# Patient Record
Sex: FEMALE | Race: WHITE | NOT HISPANIC OR LATINO | Employment: PART TIME | URBAN - METROPOLITAN AREA
[De-identification: names, ages, dates, MRNs, and addresses within clinical notes are randomized per-mention and may not be internally consistent; named-entity substitution may affect disease eponyms.]

---

## 2021-01-22 ENCOUNTER — PATIENT MESSAGE (OUTPATIENT)
Dept: ADMINISTRATIVE | Facility: OTHER | Age: 36
End: 2021-01-22

## 2021-02-04 ENCOUNTER — IMMUNIZATION (OUTPATIENT)
Dept: PHARMACY | Facility: CLINIC | Age: 36
End: 2021-02-04
Payer: COMMERCIAL

## 2021-02-04 DIAGNOSIS — Z23 NEED FOR VACCINATION: Primary | ICD-10-CM

## 2021-03-04 ENCOUNTER — IMMUNIZATION (OUTPATIENT)
Dept: PHARMACY | Facility: CLINIC | Age: 36
End: 2021-03-04
Payer: COMMERCIAL

## 2021-03-04 DIAGNOSIS — Z23 NEED FOR VACCINATION: Primary | ICD-10-CM

## 2021-07-28 ENCOUNTER — OFFICE VISIT (OUTPATIENT)
Dept: OBSTETRICS AND GYNECOLOGY | Facility: CLINIC | Age: 36
End: 2021-07-28
Payer: COMMERCIAL

## 2021-07-28 VITALS
RESPIRATION RATE: 19 BRPM | WEIGHT: 152 LBS | BODY MASS INDEX: 26.93 KG/M2 | HEIGHT: 63 IN | DIASTOLIC BLOOD PRESSURE: 78 MMHG | SYSTOLIC BLOOD PRESSURE: 104 MMHG | HEART RATE: 68 BPM

## 2021-07-28 DIAGNOSIS — Z12.4 CERVICAL CANCER SCREENING: ICD-10-CM

## 2021-07-28 DIAGNOSIS — Z01.419 ENCNTR FOR GYN EXAM (GENERAL) (ROUTINE) W/O ABN FINDINGS: Primary | ICD-10-CM

## 2021-07-28 PROCEDURE — 1160F PR REVIEW ALL MEDS BY PRESCRIBER/CLIN PHARMACIST DOCUMENTED: ICD-10-PCS | Mod: CPTII,S$GLB,, | Performed by: STUDENT IN AN ORGANIZED HEALTH CARE EDUCATION/TRAINING PROGRAM

## 2021-07-28 PROCEDURE — 99385 PREV VISIT NEW AGE 18-39: CPT | Mod: S$GLB,,, | Performed by: STUDENT IN AN ORGANIZED HEALTH CARE EDUCATION/TRAINING PROGRAM

## 2021-07-28 PROCEDURE — 87624 HPV HI-RISK TYP POOLED RSLT: CPT | Performed by: STUDENT IN AN ORGANIZED HEALTH CARE EDUCATION/TRAINING PROGRAM

## 2021-07-28 PROCEDURE — 1159F PR MEDICATION LIST DOCUMENTED IN MEDICAL RECORD: ICD-10-PCS | Mod: CPTII,S$GLB,, | Performed by: STUDENT IN AN ORGANIZED HEALTH CARE EDUCATION/TRAINING PROGRAM

## 2021-07-28 PROCEDURE — 99999 PR PBB SHADOW E&M-EST. PATIENT-LVL III: CPT | Mod: PBBFAC,,, | Performed by: STUDENT IN AN ORGANIZED HEALTH CARE EDUCATION/TRAINING PROGRAM

## 2021-07-28 PROCEDURE — 88175 CYTOPATH C/V AUTO FLUID REDO: CPT | Performed by: STUDENT IN AN ORGANIZED HEALTH CARE EDUCATION/TRAINING PROGRAM

## 2021-07-28 PROCEDURE — 99999 PR PBB SHADOW E&M-EST. PATIENT-LVL III: ICD-10-PCS | Mod: PBBFAC,,, | Performed by: STUDENT IN AN ORGANIZED HEALTH CARE EDUCATION/TRAINING PROGRAM

## 2021-07-28 PROCEDURE — 1160F RVW MEDS BY RX/DR IN RCRD: CPT | Mod: CPTII,S$GLB,, | Performed by: STUDENT IN AN ORGANIZED HEALTH CARE EDUCATION/TRAINING PROGRAM

## 2021-07-28 PROCEDURE — 1159F MED LIST DOCD IN RCRD: CPT | Mod: CPTII,S$GLB,, | Performed by: STUDENT IN AN ORGANIZED HEALTH CARE EDUCATION/TRAINING PROGRAM

## 2021-07-28 PROCEDURE — 3008F PR BODY MASS INDEX (BMI) DOCUMENTED: ICD-10-PCS | Mod: CPTII,S$GLB,, | Performed by: STUDENT IN AN ORGANIZED HEALTH CARE EDUCATION/TRAINING PROGRAM

## 2021-07-28 PROCEDURE — 1126F PR PAIN SEVERITY QUANTIFIED, NO PAIN PRESENT: ICD-10-PCS | Mod: CPTII,S$GLB,, | Performed by: STUDENT IN AN ORGANIZED HEALTH CARE EDUCATION/TRAINING PROGRAM

## 2021-07-28 PROCEDURE — 99385 PR PREVENTIVE VISIT,NEW,18-39: ICD-10-PCS | Mod: S$GLB,,, | Performed by: STUDENT IN AN ORGANIZED HEALTH CARE EDUCATION/TRAINING PROGRAM

## 2021-07-28 PROCEDURE — 3008F BODY MASS INDEX DOCD: CPT | Mod: CPTII,S$GLB,, | Performed by: STUDENT IN AN ORGANIZED HEALTH CARE EDUCATION/TRAINING PROGRAM

## 2021-07-28 PROCEDURE — 1126F AMNT PAIN NOTED NONE PRSNT: CPT | Mod: CPTII,S$GLB,, | Performed by: STUDENT IN AN ORGANIZED HEALTH CARE EDUCATION/TRAINING PROGRAM

## 2021-07-28 RX ORDER — CLONAZEPAM 0.5 MG/1
0.25 TABLET ORAL
COMMUNITY

## 2021-07-28 RX ORDER — DULOXETIN HYDROCHLORIDE 20 MG/1
CAPSULE, DELAYED RELEASE ORAL
COMMUNITY

## 2021-08-02 LAB
HPV HR 12 DNA SPEC QL NAA+PROBE: NEGATIVE
HPV16 AG SPEC QL: NEGATIVE
HPV18 DNA SPEC QL NAA+PROBE: NEGATIVE

## 2021-08-06 LAB
FINAL PATHOLOGIC DIAGNOSIS: NORMAL
Lab: NORMAL

## 2022-04-18 ENCOUNTER — PATIENT MESSAGE (OUTPATIENT)
Dept: OBSTETRICS AND GYNECOLOGY | Facility: CLINIC | Age: 37
End: 2022-04-18
Payer: COMMERCIAL

## 2022-05-30 ENCOUNTER — OFFICE VISIT (OUTPATIENT)
Dept: OBSTETRICS AND GYNECOLOGY | Facility: CLINIC | Age: 37
End: 2022-05-30
Payer: COMMERCIAL

## 2022-05-30 ENCOUNTER — TELEPHONE (OUTPATIENT)
Dept: OBSTETRICS AND GYNECOLOGY | Facility: CLINIC | Age: 37
End: 2022-05-30
Payer: COMMERCIAL

## 2022-05-30 VITALS
HEIGHT: 63 IN | RESPIRATION RATE: 12 BRPM | SYSTOLIC BLOOD PRESSURE: 116 MMHG | BODY MASS INDEX: 29.55 KG/M2 | OXYGEN SATURATION: 100 % | WEIGHT: 166.81 LBS | HEART RATE: 82 BPM | DIASTOLIC BLOOD PRESSURE: 72 MMHG

## 2022-05-30 DIAGNOSIS — N92.1 MENORRHAGIA WITH IRREGULAR CYCLE: Primary | ICD-10-CM

## 2022-05-30 PROCEDURE — 1159F MED LIST DOCD IN RCRD: CPT | Mod: CPTII,S$GLB,, | Performed by: STUDENT IN AN ORGANIZED HEALTH CARE EDUCATION/TRAINING PROGRAM

## 2022-05-30 PROCEDURE — 99213 PR OFFICE/OUTPT VISIT, EST, LEVL III, 20-29 MIN: ICD-10-PCS | Mod: S$GLB,,, | Performed by: STUDENT IN AN ORGANIZED HEALTH CARE EDUCATION/TRAINING PROGRAM

## 2022-05-30 PROCEDURE — 99213 OFFICE O/P EST LOW 20 MIN: CPT | Mod: S$GLB,,, | Performed by: STUDENT IN AN ORGANIZED HEALTH CARE EDUCATION/TRAINING PROGRAM

## 2022-05-30 PROCEDURE — 3008F BODY MASS INDEX DOCD: CPT | Mod: CPTII,S$GLB,, | Performed by: STUDENT IN AN ORGANIZED HEALTH CARE EDUCATION/TRAINING PROGRAM

## 2022-05-30 PROCEDURE — 1160F PR REVIEW ALL MEDS BY PRESCRIBER/CLIN PHARMACIST DOCUMENTED: ICD-10-PCS | Mod: CPTII,S$GLB,, | Performed by: STUDENT IN AN ORGANIZED HEALTH CARE EDUCATION/TRAINING PROGRAM

## 2022-05-30 PROCEDURE — 3074F PR MOST RECENT SYSTOLIC BLOOD PRESSURE < 130 MM HG: ICD-10-PCS | Mod: CPTII,S$GLB,, | Performed by: STUDENT IN AN ORGANIZED HEALTH CARE EDUCATION/TRAINING PROGRAM

## 2022-05-30 PROCEDURE — 99999 PR PBB SHADOW E&M-EST. PATIENT-LVL IV: CPT | Mod: PBBFAC,,, | Performed by: STUDENT IN AN ORGANIZED HEALTH CARE EDUCATION/TRAINING PROGRAM

## 2022-05-30 PROCEDURE — 3078F PR MOST RECENT DIASTOLIC BLOOD PRESSURE < 80 MM HG: ICD-10-PCS | Mod: CPTII,S$GLB,, | Performed by: STUDENT IN AN ORGANIZED HEALTH CARE EDUCATION/TRAINING PROGRAM

## 2022-05-30 PROCEDURE — 3008F PR BODY MASS INDEX (BMI) DOCUMENTED: ICD-10-PCS | Mod: CPTII,S$GLB,, | Performed by: STUDENT IN AN ORGANIZED HEALTH CARE EDUCATION/TRAINING PROGRAM

## 2022-05-30 PROCEDURE — 99999 PR PBB SHADOW E&M-EST. PATIENT-LVL IV: ICD-10-PCS | Mod: PBBFAC,,, | Performed by: STUDENT IN AN ORGANIZED HEALTH CARE EDUCATION/TRAINING PROGRAM

## 2022-05-30 PROCEDURE — 1159F PR MEDICATION LIST DOCUMENTED IN MEDICAL RECORD: ICD-10-PCS | Mod: CPTII,S$GLB,, | Performed by: STUDENT IN AN ORGANIZED HEALTH CARE EDUCATION/TRAINING PROGRAM

## 2022-05-30 PROCEDURE — 3078F DIAST BP <80 MM HG: CPT | Mod: CPTII,S$GLB,, | Performed by: STUDENT IN AN ORGANIZED HEALTH CARE EDUCATION/TRAINING PROGRAM

## 2022-05-30 PROCEDURE — 1160F RVW MEDS BY RX/DR IN RCRD: CPT | Mod: CPTII,S$GLB,, | Performed by: STUDENT IN AN ORGANIZED HEALTH CARE EDUCATION/TRAINING PROGRAM

## 2022-05-30 PROCEDURE — 3074F SYST BP LT 130 MM HG: CPT | Mod: CPTII,S$GLB,, | Performed by: STUDENT IN AN ORGANIZED HEALTH CARE EDUCATION/TRAINING PROGRAM

## 2022-05-30 NOTE — PROGRESS NOTES
Subjective:       Patient ID: Lou Mcadams is a 37 y.o. female.    Chief Complaint:  Consult      History of Present Illness  Patient is a 38 yo  presenting to discuss management of irregular menses. She is reporting irregular menses with significant spotting throughout the month. Cycles has worsened significantly over the last year. She has used birth control in the past and had difficulties with the side effects. Her  is status post vasectomy. She would like an ablation.         GYN & OB History  No LMP recorded.   Date of Last Pap: 2021    OB History    Para Term  AB Living   4 3 3   1 3   SAB IAB Ectopic Multiple Live Births   1       3      # Outcome Date GA Lbr Dave/2nd Weight Sex Delivery Anes PTL Lv   4 SAB            3 Term            2 Term            1 Term                Review of Systems  Review of Systems   Constitutional: Positive for fatigue. Negative for chills, fever and unexpected weight change.   HENT: Negative for congestion, sinus pain and sore throat.    Eyes: Negative for visual disturbance.   Respiratory: Negative for cough, chest tightness and shortness of breath.    Cardiovascular: Positive for palpitations. Negative for chest pain.   Gastrointestinal: Negative for abdominal pain, constipation, diarrhea, nausea and vomiting.   Genitourinary: Positive for menstrual problem. Negative for vaginal bleeding, vaginal discharge and vaginal pain.   Musculoskeletal: Negative for myalgias.   Skin: Negative for color change, pallor and rash.   Neurological: Positive for dizziness and light-headedness. Negative for headaches.   Psychiatric/Behavioral: The patient is nervous/anxious.            Objective:    Physical Exam:   Constitutional: She is oriented to person, place, and time. She appears well-developed and well-nourished. No distress.    HENT:   Head: Normocephalic and atraumatic.    Eyes: Pupils are equal, round, and reactive to light. EOM are normal.      Cardiovascular: Normal rate.     Pulmonary/Chest: Effort normal.                  Musculoskeletal: Normal range of motion and moves all extremeties.       Neurological: She is alert and oriented to person, place, and time.    Skin: Skin is warm and dry. No erythema.    Psychiatric: She has a normal mood and affect. Her behavior is normal. Judgment and thought content normal.          Assessment:        1. Menorrhagia with irregular cycle             Plan:      Lou was seen today for consult.    Diagnoses and all orders for this visit:    Menorrhagia with irregular cycle  -     Case Request Operating Room: HYSTEROSCOPY, WITH DILATION AND CURETTAGE OF UTERUS, ABLATION, ENDOMETRIUM, THERMAL, HYSTEROSCOPIC  -     US Pelvis Comp with Transvag NON-OB (xpd; Future

## 2022-06-03 ENCOUNTER — APPOINTMENT (OUTPATIENT)
Dept: RADIOLOGY | Facility: CLINIC | Age: 37
End: 2022-06-03
Attending: STUDENT IN AN ORGANIZED HEALTH CARE EDUCATION/TRAINING PROGRAM
Payer: COMMERCIAL

## 2022-06-03 DIAGNOSIS — N92.1 MENORRHAGIA WITH IRREGULAR CYCLE: ICD-10-CM

## 2022-06-03 PROCEDURE — 76856 US PELVIS COMP WITH TRANSVAG NON-OB (XPD): ICD-10-PCS | Mod: 26,,, | Performed by: RADIOLOGY

## 2022-06-03 PROCEDURE — 76856 US EXAM PELVIC COMPLETE: CPT | Mod: 26,,, | Performed by: RADIOLOGY

## 2022-06-03 PROCEDURE — 76830 US PELVIS COMP WITH TRANSVAG NON-OB (XPD): ICD-10-PCS | Mod: 26,,, | Performed by: RADIOLOGY

## 2022-06-03 PROCEDURE — 76830 TRANSVAGINAL US NON-OB: CPT | Mod: TC

## 2022-06-03 PROCEDURE — 76830 TRANSVAGINAL US NON-OB: CPT | Mod: 26,,, | Performed by: RADIOLOGY

## 2022-07-25 ENCOUNTER — PATIENT MESSAGE (OUTPATIENT)
Dept: SURGERY | Facility: HOSPITAL | Age: 37
End: 2022-07-25
Payer: COMMERCIAL

## 2022-07-26 NOTE — TELEPHONE ENCOUNTER
Surgery, pre op, pre admit and post op appointments cancelled per patient's request.  Removed from surgery board.

## 2022-09-19 ENCOUNTER — OFFICE VISIT (OUTPATIENT)
Dept: OBSTETRICS AND GYNECOLOGY | Facility: CLINIC | Age: 37
End: 2022-09-19
Payer: COMMERCIAL

## 2022-09-19 VITALS
BODY MASS INDEX: 30.77 KG/M2 | SYSTOLIC BLOOD PRESSURE: 112 MMHG | HEART RATE: 60 BPM | DIASTOLIC BLOOD PRESSURE: 72 MMHG | HEIGHT: 63 IN | WEIGHT: 173.63 LBS

## 2022-09-19 DIAGNOSIS — Z01.419 ENCNTR FOR GYN EXAM (GENERAL) (ROUTINE) W/O ABN FINDINGS: Primary | ICD-10-CM

## 2022-09-19 PROCEDURE — 3078F DIAST BP <80 MM HG: CPT | Mod: CPTII,S$GLB,, | Performed by: STUDENT IN AN ORGANIZED HEALTH CARE EDUCATION/TRAINING PROGRAM

## 2022-09-19 PROCEDURE — 1159F MED LIST DOCD IN RCRD: CPT | Mod: CPTII,S$GLB,, | Performed by: STUDENT IN AN ORGANIZED HEALTH CARE EDUCATION/TRAINING PROGRAM

## 2022-09-19 PROCEDURE — 1160F PR REVIEW ALL MEDS BY PRESCRIBER/CLIN PHARMACIST DOCUMENTED: ICD-10-PCS | Mod: CPTII,S$GLB,, | Performed by: STUDENT IN AN ORGANIZED HEALTH CARE EDUCATION/TRAINING PROGRAM

## 2022-09-19 PROCEDURE — 99999 PR PBB SHADOW E&M-EST. PATIENT-LVL III: CPT | Mod: PBBFAC,,, | Performed by: STUDENT IN AN ORGANIZED HEALTH CARE EDUCATION/TRAINING PROGRAM

## 2022-09-19 PROCEDURE — 3074F SYST BP LT 130 MM HG: CPT | Mod: CPTII,S$GLB,, | Performed by: STUDENT IN AN ORGANIZED HEALTH CARE EDUCATION/TRAINING PROGRAM

## 2022-09-19 PROCEDURE — 1160F RVW MEDS BY RX/DR IN RCRD: CPT | Mod: CPTII,S$GLB,, | Performed by: STUDENT IN AN ORGANIZED HEALTH CARE EDUCATION/TRAINING PROGRAM

## 2022-09-19 PROCEDURE — 99395 PREV VISIT EST AGE 18-39: CPT | Mod: S$GLB,,, | Performed by: STUDENT IN AN ORGANIZED HEALTH CARE EDUCATION/TRAINING PROGRAM

## 2022-09-19 PROCEDURE — 1159F PR MEDICATION LIST DOCUMENTED IN MEDICAL RECORD: ICD-10-PCS | Mod: CPTII,S$GLB,, | Performed by: STUDENT IN AN ORGANIZED HEALTH CARE EDUCATION/TRAINING PROGRAM

## 2022-09-19 PROCEDURE — 99999 PR PBB SHADOW E&M-EST. PATIENT-LVL III: ICD-10-PCS | Mod: PBBFAC,,, | Performed by: STUDENT IN AN ORGANIZED HEALTH CARE EDUCATION/TRAINING PROGRAM

## 2022-09-19 PROCEDURE — 3008F PR BODY MASS INDEX (BMI) DOCUMENTED: ICD-10-PCS | Mod: CPTII,S$GLB,, | Performed by: STUDENT IN AN ORGANIZED HEALTH CARE EDUCATION/TRAINING PROGRAM

## 2022-09-19 PROCEDURE — 3078F PR MOST RECENT DIASTOLIC BLOOD PRESSURE < 80 MM HG: ICD-10-PCS | Mod: CPTII,S$GLB,, | Performed by: STUDENT IN AN ORGANIZED HEALTH CARE EDUCATION/TRAINING PROGRAM

## 2022-09-19 PROCEDURE — 3008F BODY MASS INDEX DOCD: CPT | Mod: CPTII,S$GLB,, | Performed by: STUDENT IN AN ORGANIZED HEALTH CARE EDUCATION/TRAINING PROGRAM

## 2022-09-19 PROCEDURE — 3074F PR MOST RECENT SYSTOLIC BLOOD PRESSURE < 130 MM HG: ICD-10-PCS | Mod: CPTII,S$GLB,, | Performed by: STUDENT IN AN ORGANIZED HEALTH CARE EDUCATION/TRAINING PROGRAM

## 2022-09-19 PROCEDURE — 99395 PR PREVENTIVE VISIT,EST,18-39: ICD-10-PCS | Mod: S$GLB,,, | Performed by: STUDENT IN AN ORGANIZED HEALTH CARE EDUCATION/TRAINING PROGRAM

## 2022-09-19 NOTE — PROGRESS NOTES
Subjective:    Patient ID: Lou Mcadams is a 37 y.o. y.o. female.     Chief Complaint: Annual Well Woman Exam     History of Present Illness:  Lou presents today for Annual Well Woman exam. She describes her menses as  very heavy and monthly .She denies pelvic pain.  She denies breast tenderness, masses, nipple discharge. She admits to difficulty with constipation.  She is sexually active. Contraception is by vasectomy.      Menstrual History:   Patient's last menstrual period was 2022..     OB History          4    Para   3    Term   3            AB   1    Living   3         SAB   1    IAB        Ectopic        Multiple        Live Births   3                 The following portions of the patient's history were reviewed and updated as appropriate: allergies, current medications, past family history, past medical history, past social history, past surgical history, and problem list.    ROS:   CONSTITUTIONAL: Negative for fever, chills, diaphoresis, weakness, fatigue, weight loss, weight gain  ENT: negative for sore throat, nasal congestion, nasal discharge, epistaxis, tinnitus, hearing loss  EYES: negative for blurry vision, decreased vision, loss of vision, eye pain, diplopia, photophobia, discharge  SKIN: Negative for rash, itching, hives  RESPIRATORY: negative for cough, hemoptysis, shortness of breath, pleuritic chest pain, wheezing  CARDIOVASCULAR: negative for chest pain, dyspnea on exertion, orthopnea, paroxysmal nocturnal dyspnea, edema, palpitations  BREAST: negative for breast  tenderness, breast mass, nipple discharge, or skin changes  GASTROINTESTINAL: + constipation  GENITOURINARY: vaginal bleeding, irregular menses, heavy menses, negative for abnormal vaginal bleeding, amenorrhea, decreased libido, dysuria, genital sores, hematuria, incontinence, menorrhagia, pelvic pain, urinary frequency, vaginal discharge  HEMATOLOGIC/LYMPHATIC: negative for swollen lymph nodes, bleeding,  bruising  MUSCULOSKELETAL: negative for back pain, joint pain, joint stiffness, joint swelling, muscle pain, muscle weakness  NEUROLOGICAL: negative for dizzy/vertigo, headache, focal weakness, numbness/tingling, speech problems, loss of consciousness, confusion, memory loss  BEHAVORIAL/PSYCH: negative for anxiety, depression, psychosis  ENDOCRINE: negative for polydipsia/polyuria, palpitations, skin changes, temperature intolerance, unexpected weight changes  ALLERGIC/IMMUNOLOGIC: negative for urticaria, hay fever, angioedema      Objective:    Vital Signs:  Vitals:    09/19/22 1405   BP: 112/72   Pulse: 60       Physical Exam:  General:  alert, cooperative, no distress   Skin:  Skin color, texture, turgor normal. No rashes or lesions   HEENT:  conjunctivae/corneas clear. PERRL.   Neck: supple, trachea midline, no adenopathy or thyromegally   Respiratory:  Normal effort   Breasts:  no discharge, erythema, tenderness, or palpable masses; no axillary lymphadenopathy   Abdomen:  soft, nontender, no palpable masses   Pelvis: External genitalia: normal general appearance  Urinary system: urethral meatus normal, bladder nontender  Vaginal: normal mucosa without prolapse or lesions  Cervix: normal appearance  Uterus: normal size, shape, position  Adnexa: normal size, nontender bilaterally   Extremities: Normal ROM; no edema, no cyanosis   Neurologial: Normal strength and tone. No focal numbness or weakness.   Psychiatric: normal mood, speech, dress, and thought processes             Assessment:       Healthy female exam.     1. Encntr for gyn exam (general) (routine) w/o abn findings          Plan:      Problem List Items Addressed This Visit    None  Visit Diagnoses       Encntr for gyn exam (general) (routine) w/o abn findings    -  Primary            COUNSELING:  Lou was counseled on STD prevention, use and side-effects of various contraceptive measures, A.C.O.G. Pap guidelines and recommendations for yearly pelvic  exams in addition to recommendations for monthly self breast exams; to see her PCP for other health maintenance.

## 2022-11-11 NOTE — TELEPHONE ENCOUNTER
----- Message from Giuliana Jaurez MD sent at 5/30/2022 11:57 AM CDT -----  Hysteroscopy, d&C and ablation      PAST SURGICAL HISTORY:  CAD (coronary artery disease) had stents placed x 3, 2005    S/P carpal tunnel release Right  x 3 , last time was in 2012 or 2013    S/P cervical spinal fusion 2013    S/P tendon repair right antecubital area

## 2023-11-06 ENCOUNTER — OFFICE VISIT (OUTPATIENT)
Dept: OBSTETRICS AND GYNECOLOGY | Facility: CLINIC | Age: 38
End: 2023-11-06
Payer: COMMERCIAL

## 2023-11-06 VITALS
WEIGHT: 168.19 LBS | HEIGHT: 63 IN | DIASTOLIC BLOOD PRESSURE: 70 MMHG | HEART RATE: 74 BPM | SYSTOLIC BLOOD PRESSURE: 110 MMHG | BODY MASS INDEX: 29.8 KG/M2

## 2023-11-06 DIAGNOSIS — Z01.419 ENCNTR FOR GYN EXAM (GENERAL) (ROUTINE) W/O ABN FINDINGS: Primary | ICD-10-CM

## 2023-11-06 DIAGNOSIS — R10.2 PELVIC PAIN: ICD-10-CM

## 2023-11-06 PROCEDURE — 99395 PR PREVENTIVE VISIT,EST,18-39: ICD-10-PCS | Mod: S$GLB,,, | Performed by: STUDENT IN AN ORGANIZED HEALTH CARE EDUCATION/TRAINING PROGRAM

## 2023-11-06 PROCEDURE — 3074F PR MOST RECENT SYSTOLIC BLOOD PRESSURE < 130 MM HG: ICD-10-PCS | Mod: CPTII,S$GLB,, | Performed by: STUDENT IN AN ORGANIZED HEALTH CARE EDUCATION/TRAINING PROGRAM

## 2023-11-06 PROCEDURE — 3078F PR MOST RECENT DIASTOLIC BLOOD PRESSURE < 80 MM HG: ICD-10-PCS | Mod: CPTII,S$GLB,, | Performed by: STUDENT IN AN ORGANIZED HEALTH CARE EDUCATION/TRAINING PROGRAM

## 2023-11-06 PROCEDURE — 99999 PR PBB SHADOW E&M-EST. PATIENT-LVL III: CPT | Mod: PBBFAC,,, | Performed by: STUDENT IN AN ORGANIZED HEALTH CARE EDUCATION/TRAINING PROGRAM

## 2023-11-06 PROCEDURE — 99999 PR PBB SHADOW E&M-EST. PATIENT-LVL III: ICD-10-PCS | Mod: PBBFAC,,, | Performed by: STUDENT IN AN ORGANIZED HEALTH CARE EDUCATION/TRAINING PROGRAM

## 2023-11-06 PROCEDURE — 1159F MED LIST DOCD IN RCRD: CPT | Mod: CPTII,S$GLB,, | Performed by: STUDENT IN AN ORGANIZED HEALTH CARE EDUCATION/TRAINING PROGRAM

## 2023-11-06 PROCEDURE — 99395 PREV VISIT EST AGE 18-39: CPT | Mod: S$GLB,,, | Performed by: STUDENT IN AN ORGANIZED HEALTH CARE EDUCATION/TRAINING PROGRAM

## 2023-11-06 PROCEDURE — 1160F RVW MEDS BY RX/DR IN RCRD: CPT | Mod: CPTII,S$GLB,, | Performed by: STUDENT IN AN ORGANIZED HEALTH CARE EDUCATION/TRAINING PROGRAM

## 2023-11-06 PROCEDURE — 3078F DIAST BP <80 MM HG: CPT | Mod: CPTII,S$GLB,, | Performed by: STUDENT IN AN ORGANIZED HEALTH CARE EDUCATION/TRAINING PROGRAM

## 2023-11-06 PROCEDURE — 3008F BODY MASS INDEX DOCD: CPT | Mod: CPTII,S$GLB,, | Performed by: STUDENT IN AN ORGANIZED HEALTH CARE EDUCATION/TRAINING PROGRAM

## 2023-11-06 PROCEDURE — 1159F PR MEDICATION LIST DOCUMENTED IN MEDICAL RECORD: ICD-10-PCS | Mod: CPTII,S$GLB,, | Performed by: STUDENT IN AN ORGANIZED HEALTH CARE EDUCATION/TRAINING PROGRAM

## 2023-11-06 PROCEDURE — 3074F SYST BP LT 130 MM HG: CPT | Mod: CPTII,S$GLB,, | Performed by: STUDENT IN AN ORGANIZED HEALTH CARE EDUCATION/TRAINING PROGRAM

## 2023-11-06 PROCEDURE — 3008F PR BODY MASS INDEX (BMI) DOCUMENTED: ICD-10-PCS | Mod: CPTII,S$GLB,, | Performed by: STUDENT IN AN ORGANIZED HEALTH CARE EDUCATION/TRAINING PROGRAM

## 2023-11-06 PROCEDURE — 1160F PR REVIEW ALL MEDS BY PRESCRIBER/CLIN PHARMACIST DOCUMENTED: ICD-10-PCS | Mod: CPTII,S$GLB,, | Performed by: STUDENT IN AN ORGANIZED HEALTH CARE EDUCATION/TRAINING PROGRAM

## 2023-11-06 RX ORDER — LEVOTHYROXINE, LIOTHYRONINE 57; 13.5 UG/1; UG/1
90 TABLET ORAL
COMMUNITY
Start: 2023-10-28

## 2023-11-06 RX ORDER — PROGESTERONE 200 MG/1
200 CAPSULE ORAL NIGHTLY
COMMUNITY
Start: 2023-07-31

## 2023-11-06 NOTE — PROGRESS NOTES
Subjective:    Patient ID: Lou Mcadams is a 38 y.o. y.o. female.     Chief Complaint: Annual Well Woman Exam     History of Present Illness:  Lou presents today for Annual Well Woman exam. She describes her menses as regular every month without intermenstrual spotting.She admits to pelvic pain intermittently She denies breast tenderness, masses, nipple discharge. She admits to urinary frequency. . She is sexually active. Contraception is by vasectomy.      Menstrual History:   Patient's last menstrual period was 10/30/2023..     OB History          4    Para   3    Term   3            AB   1    Living   3         SAB   1    IAB        Ectopic        Multiple        Live Births   3                 The following portions of the patient's history were reviewed and updated as appropriate: allergies, current medications, past family history, past medical history, past social history, past surgical history, and problem list.    ROS:   CONSTITUTIONAL: Negative for fever, chills, diaphoresis, weakness, fatigue, weight loss, weight gain  ENT: negative for sore throat, nasal congestion, nasal discharge, epistaxis, tinnitus, hearing loss  EYES: negative for blurry vision, decreased vision, loss of vision, eye pain, diplopia, photophobia, discharge  SKIN: Negative for rash, itching, hives  RESPIRATORY: negative for cough, hemoptysis, shortness of breath, pleuritic chest pain, wheezing  CARDIOVASCULAR: negative for chest pain, dyspnea on exertion, orthopnea, paroxysmal nocturnal dyspnea, edema, palpitations  BREAST: negative for breast  tenderness, breast mass, nipple discharge, or skin changes  GASTROINTESTINAL: negative for abdominal pain, flank pain, nausea, vomiting, diarrhea, constipation, black stool, blood in stool  GENITOURINARY: pelvic pain  HEMATOLOGIC/LYMPHATIC: negative for swollen lymph nodes, bleeding, bruising  MUSCULOSKELETAL: back pain, negative for back pain, joint pain, joint stiffness,  joint swelling, muscle pain, muscle weakness  NEUROLOGICAL: negative for dizzy/vertigo, headache, focal weakness, numbness/tingling, speech problems, loss of consciousness, confusion, memory loss  BEHAVORIAL/PSYCH: nervous/anxious  ENDOCRINE: negative for polydipsia/polyuria, palpitations, skin changes, temperature intolerance, unexpected weight changes  ALLERGIC/IMMUNOLOGIC: negative for urticaria, hay fever, angioedema      Objective:    Vital Signs:  Vitals:    11/06/23 1616   BP: 110/70   Pulse: 74       Physical Exam:  General:  alert, cooperative, no distress   Skin:  Skin color, texture, turgor normal. No rashes or lesions   HEENT:  conjunctivae/corneas clear. PERRL.   Neck: supple, trachea midline, no adenopathy or thyromegally   Respiratory:  Normal effort   Breasts:  no discharge, erythema, tenderness, or palpable masses; no axillary lymphadenopathy   Abdomen:  soft, nontender, no palpable masses   Pelvis: External genitalia: normal general appearance  Urinary system: urethral meatus normal, bladder nontender  Vaginal: normal mucosa without prolapse or lesions  Cervix: normal appearance  Uterus: normal size, shape, position  Adnexa: normal size, nontender bilaterally   Extremities: Normal ROM; no edema, no cyanosis   Neurologial: Normal strength and tone. No focal numbness or weakness.   Psychiatric: normal mood, speech, dress, and thought processes         Assessment:       Healthy female exam.     1. Encntr for gyn exam (general) (routine) w/o abn findings    2. Pelvic pain          Plan:      Problem List Items Addressed This Visit    None  Visit Diagnoses       Encntr for gyn exam (general) (routine) w/o abn findings    -  Primary    Pelvic pain        Relevant Orders    Ambulatory referral/consult to Physical/Occupational Therapy              COUNSELING:  Lou was counseled on STD prevention, use and side-effects of various contraceptive measures, A.C.O.G. Pap guidelines and recommendations for  yearly pelvic exams in addition to recommendations for monthly self breast exams; to see her PCP for other health maintenance.

## 2024-01-24 ENCOUNTER — CLINICAL SUPPORT (OUTPATIENT)
Dept: REHABILITATION | Facility: HOSPITAL | Age: 39
End: 2024-01-24
Attending: STUDENT IN AN ORGANIZED HEALTH CARE EDUCATION/TRAINING PROGRAM
Payer: COMMERCIAL

## 2024-01-24 DIAGNOSIS — R27.8 COORDINATION ABNORMAL: ICD-10-CM

## 2024-01-24 DIAGNOSIS — R10.2 PELVIC PAIN: ICD-10-CM

## 2024-01-24 DIAGNOSIS — M62.838 MUSCLE SPASM: Primary | ICD-10-CM

## 2024-01-24 PROCEDURE — 97161 PT EVAL LOW COMPLEX 20 MIN: CPT | Mod: PN | Performed by: PHYSICAL THERAPIST

## 2024-01-24 NOTE — PLAN OF CARE
[facility]   Physical Therapy Initial Evaluation     Date: 2024   Name: Lou Mcadams  Clinic Number: [id number]    Therapy Diagnosis:   Encounter Diagnoses   Name Primary?    Pelvic pain     Muscle spasm Yes    Coordination abnormal      Physician: Giuliana Juarez *    Physician Orders: PT Eval and Treat PF PT  Medical Diagnosis from Referral: R10.2 (ICD-10-CM) - Pelvic pain  Evaluation Date: 2024  Authorization Period Expiration: 2024  Plan of Care Expiration: 2024  Progress Note Due: 2024  Visit #  Visits authorized:    FOTO: 1/3      Precautions: Standard     Time In: 9:49 AM   Time Out: 10:50 AM   Total Appointment Time (timed & untimed codes): 61 minutes    HISTORY      Lou is a 38 y.o. female evaluated on 2024    Physician:  Giuliana Juarez *   Diagnosis:   Encounter Diagnoses   Name Primary?    Pelvic pain     Muscle spasm Yes    Coordination abnormal       Treatment ordered: Physical Therapy  Medical History:   Past Medical History:   Diagnosis Date    Abnormal Pap smear of cervix     repeated pap, and it was normal     Mental disorder     anxiety    Thyroid disease - hypothyroidism - had a thyroid nodule - had thyroid ablation - gets regular ultrasounds       Surgical History:   Past Surgical History:   Procedure Laterality Date     SECTION      x3    DILATION AND CURETTAGE OF UTERUS  2007    missed ab      Medications:   Current Outpatient Medications   Medication Sig    DULoxetine (CYMBALTA) 20 MG capsule duloxetine 20 mg capsule,delayed release    NP THYROID 90 mg Tab Take 90 mg by mouth.    progesterone (PROMETRIUM) 200 MG capsule Take 200 mg by mouth every evening.     No current facility-administered medications for this visit.       Allergies:   Review of patient's allergies indicates:  No Known Allergies     Precautions: universal    OB/GYN History:        Bladder/Bowel History: constipation/straining for  "movement, urgency a times      SUBJECTIVE     Date of onset: about 1.5 years ago    History of current complaint: patient states that when she turned 32 she had a lot of neck and upper body pain. It eventually subsided. She does admit to anxiety. She started cross fit off/on for the last 6 years. She graduated last year and works on her feet 12 hour shifts. She does not have time to work out. Feels like she is heavy in her hips. States that her cycles started changing. Not dx with PCOS or endo. Does have cysts on her ovaries. Has had ultrasounds done of her pelvis. States that she also feels lipomas in her legs. Feels like everything is stirring other things up. Also complaints of sciatica on both sides - it alternates. Denies pain with intercourse. "I live constipated".     Sees a holistic MD in [place] Talha Arzola.     States that her uterus is tilted.     Patient's goals for therapy: Improve pelvic pain and ROM     Pain: Patient reports 1/10, with 0 being the lowest and 10 being the highest. Had a good deep tissue massage on Monday.     Activities that cause symptoms: strong urge to go, prolonged sitting, prolonged standing, and tampon insertion    Previous treatment included none    Sexually active? Yes - complaints of dryness     Frequency of urination:   Daytime: depends on hydration - will urinate every 2 hours, "I can hold it"          Nighttime: occasionally 1xs    Difficulty initiating urine stream: No  Urine stream: strong  Complete emptying: Yes  Bladder leakage: No  Frequency of incidents: none  Amount leaked (urine): none    Frequency of bowel movements: 2xs a week  Difficulty initiating BM: yes will strain at times, gets impacted  Quality/Shape of BM: [place] Stool Chart fluctuates   Colon leakage: No  Frequency of incidents: none   Amount leaked (bowels): none  Form of protection: none  Number of pads required in 24 hours: none    Types of fluid intake: tries to drink 100oz of water a day, " caffeine - coffee and an energy drink at times  Diet: tries to follow a balanced diet   Current exercise: Doing some light strength training at home. Does walk for cardio - indoors or outdoors a few times a week. Will flare up her pain at times. Does love Yoga, does not do it often.     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    OBJECTIVE     ORTHO SCREEN  Posture: WNL  Pelvic alignment: not assessed    ABDOMINALS - not assessed    VAGINAL PELVIC FLOOR EXAM - to be performed next visit    TREATMENT      Education: instructed on general anatomy/physiology of urinary/bowel system; discussed plan of care with patient and parent/guardian; instructed in benefits/risks of treatment; instructed in alternative methods of treatment; instructed in risks of refusing treatment; patient a parent agreed to treatment plan.     Also educated in: bladder irritants, anatomy/physiology of pelvic floor, posture/body mechanices, diaphragmatic breathing, and fluid intake/dietary modifications    ASSESSMENT      This is a 38 y.o. female referred to outpatient physical therapy and presents with a medical diagnosis of R10.2 (ICD-10-CM) - Pelvic pain. Patient will benefit from skilled physical therapy to improve pelvic floor mobility and awareness, improve bowel habits, and improve pelvic pain.    Educational/Spiritual/Cultural needs: none  Abuse/Neglect: no signs  Nutritional Status: WDWN   Fall Risk: patient is not a fall risk    Pt's spiritual, cultural and educational needs considered and pt agreeable to plan of care and goals as stated below:     Medical Necessity is demonstrated by the following:  History  Co-morbidities and personal factors that may impact the plan of care [x] LOW: no personal factors / co-morbidities  [] MODERATE: 1-2 personal factors / co-morbidities  [] HIGH: 3+ personal factors / co-morbidities    Moderate / High Support  Documentation:   Co-morbidities affecting plan of care: none    Personal Factors:   no deficits     Examination  Body Structures and Functions, activity limitations and participation restrictions that may impact the plan of care [x] LOW: addressing 1-2 elements  [] MODERATE: 3+ elements  [] HIGH: 4+ elements (please support below)    Moderate / High Support Documentation:      Clinical Presentation [x] LOW: stable  [] MODERATE: Evolving  [] HIGH: Unstable     Decision Making/ Complexity Score: low           PLAN    Frequency: 1x per week  Duration: 12 weeks    Short Term Goals: 6 weeks   Patient will be independent with pelvic floor relaxation to improve bowel and bladder evacuation.   Patient will be able to demonstrate improved pelvic floor relaxation and contraction on rehabilitative ultrasound.   Patient will deny urgency of urine.   Patient will report being consistent with improved water intake and decreased caffeine intake.     Long Term Goals: 12 weeks   Patient will report urinating every 3-4 hours with strong urine stream.   Patient will deny pelvic pain.   Patient will be independent with progressive home exercise program.   Patient will be independent with use of pelvic wand to assist with pelvic pain.  Patient will be able to use tampons without pain.   Patient will demonstrate adequate pelvic floor coordination with contraction and relaxation on sEMG vs rehabilitative ultrasound.       Physical therapy will include: therapeutic exercise, manual therapy, therapeutic activities, neuromuscular re-education, manual therapy, modalities PRN, gait training, and self-care education.     Therapist: Tod Tsang PT  Board-certified Women's Health Clinical Specialist  1/24/2024

## 2024-01-26 ENCOUNTER — PATIENT MESSAGE (OUTPATIENT)
Dept: REHABILITATION | Facility: HOSPITAL | Age: 39
End: 2024-01-26
Payer: COMMERCIAL

## 2024-01-30 ENCOUNTER — CLINICAL SUPPORT (OUTPATIENT)
Dept: REHABILITATION | Facility: HOSPITAL | Age: 39
End: 2024-01-30
Attending: STUDENT IN AN ORGANIZED HEALTH CARE EDUCATION/TRAINING PROGRAM
Payer: COMMERCIAL

## 2024-01-30 DIAGNOSIS — R27.8 COORDINATION ABNORMAL: ICD-10-CM

## 2024-01-30 DIAGNOSIS — R10.2 PELVIC PAIN: Primary | ICD-10-CM

## 2024-01-30 DIAGNOSIS — M62.838 MUSCLE SPASM: ICD-10-CM

## 2024-01-30 PROCEDURE — 97530 THERAPEUTIC ACTIVITIES: CPT | Performed by: PHYSICAL THERAPIST

## 2024-01-30 PROCEDURE — 97112 NEUROMUSCULAR REEDUCATION: CPT | Performed by: PHYSICAL THERAPIST

## 2024-01-30 NOTE — PATIENT INSTRUCTIONS
Constipation   - 1/2 of your body weight in water - about 84oz of water a day - increase this with caffeine    Gastro colic reflex: morning routine  Wake up - drink some water   Go urinate if you need  Return to bed or a chair - comfortable   Abdominal massage using 3 flat fingers - ILU - 2-3 min as often as you need  Do not suppress the urge for a bowel movement - ever  15 min after you are awake - NOTICE the urge for a bowel movement  Attempt to have BM - Put your feet on a stool  Relax the pelvic floor do not strain - imagine the rectum like a flower blooming          [email or web address]     DIAPHRAGMATIC BREATHING     The diaphragm is a dome shaped muscle that forms the floor of the rib cage. It is the most efficient muscle for breathing and relaxation, although most people are not used to using the diaphragm. Diaphragmatic or belly breathing is an important technique to learn because it helps settle down or relax the autonomic nervous system. The correct use of diaphragmatic breathing can help to quiet brain activity resulting in the relaxation of all the muscles and organs of the body. This is accomplished by slow rhythmic breathing concentrated in the diaphragm muscle rather than the chest.    How to do proper relaxation breathing:    Start by lying on your back or reclining in a chair in a relaxed position. Place one hand on your chest and the other on your abdomen.  Relax your jaw by placing your tongue on the floor of your mouth and keeping your teeth slightly apart.   Take a deep breath in, letting the abdomen expand and rise while you keep your upper chest, neck and shoulders relaxed.   As you breathe out, allow your abdomen and chest to fall. Exhale completely.  It doesn't matter if you breathe in/out through your nose and/or mouth. Do whichever feels comfortable.  Remember to breathe slowly.  Do not force your breathing. Do not hold your  breath.  Repeat for 5 minutes every day.         Pelvic floor relaxation: gentle feeling of down and out   - occurs when urination, bowel movement, vaginal penetration, or passing gas  - every hour - tune into your pelvic floor and relax   - do not hold tension in the hips  - with breathing - let the diaphragm descend and the pelvic floor descend

## 2024-01-30 NOTE — PROGRESS NOTES
Pelvic Health Physical Therapy   Treatment Note     Name: Lou Mcadams  Clinic Number: [id number]    Therapy Diagnosis: No diagnosis found.  Physician: No ref. provider found    Visit Date: 1/30/2024    Physician Orders: PT Eval and Treat PF PT  Medical Diagnosis from Referral: R10.2 (ICD-10-CM) - Pelvic pain  Evaluation Date: 1/24/2024  Authorization Period Expiration: 12/31/2024  Plan of Care Expiration: 4/17/2024  Progress Note Due: 2/21/2024  Visit # 2/12 Visits authorized: 1/ 1   FOTO: 1/3      Cancelled Visits: 0  No Show Visits: 0    Time In: 2:32 PM   Time Out: 3:23 PM   Total Billable Time: 51 minutes    Precautions: Standard    Subjective     Pt reports: She is on her period. Deferred vaginal exam. She does noticed that she is having heavier periods. She is on progesterone that has helped.    She was compliant with home exercise program.  Response to previous treatment: none  Functional change: none     Pain in:  0/10  Pain out: 0/10  Location: pelvic        Objective     Lou participated in neuromuscular re-education activities to develop Coordination, Down training, and Proprioception for 15 minutes including: rehabilitative ultrasound imaging to help visualize pelvic floor muscle contraction and relaxation with kegels  Rehabilitative ultrasound     Lou participated in dynamic functional therapeutic activities to improve functional performance for 36  minutes, including:  Gastro colic, diaphragmatic breathing, improved body awareness          Intervention Eval  01/30/2024           Neuro Re-Ed    rehabilitative ultrasound imaging to help visualize pelvic floor muscle contraction and relaxation with kegels  Rehabilitative ultrasound      Manual Ther         TherAct     Gastro colic, diaphragmatic breathing, improved body awareness           Home Exercises Provided and Patient Education Provided     Education provided:   - anatomy/physiology of pelvic floor, posture/body mechanices, proper bearing  down techniques, and fluid intake/dietary modifications  Discussed progression of plan of care with patient; educated pt in activity modification; reviewed HEP with pt. Pt demonstrated and verbalized understanding of all instruction and was provided with a handout of HEP (see Patient Instructions).    Written Home Exercises Provided: Patient instructed to cont prior HEP.  Exercises were reviewed and Lou was able to demonstrate them prior to the end of the session.  Lou demonstrated good  understanding of the education provided.     See EMR under Patient Instructions for exercises provided 01/30/2024 .    Assessment     Good understanding of physical therapist recommendations.     Lou Is progressing well towards her goals.   Pt prognosis is Good.     Pt will continue to benefit from skilled outpatient physical therapy to address the deficits listed in the problem list box on initial evaluation, provide pt/family education and to maximize pt's level of independence in the home and community environment.     Pt's spiritual, cultural and educational needs considered and pt agreeable to plan of care and goals.     Anticipated barriers to physical therapy: none    Short Term Goals: 6 weeks   Patient will be independent with pelvic floor relaxation to improve bowel and bladder evacuation.   Patient will be able to demonstrate improved pelvic floor relaxation and contraction on rehabilitative ultrasound.   Patient will deny urgency of urine.   Patient will report being consistent with improved water intake and decreased caffeine intake.      Long Term Goals: 12 weeks   Patient will report urinating every 3-4 hours with strong urine stream.   Patient will deny pelvic pain.   Patient will be independent with progressive home exercise program.   Patient will be independent with use of pelvic wand to assist with pelvic pain.  Patient will be able to use tampons without pain.   Patient will demonstrate adequate pelvic  floor coordination with contraction and relaxation on sEMG vs rehabilitative ultrasound.     Plan     Continue with progressive PF mobility and awareness.     Tod Tsang, PT

## 2024-03-06 ENCOUNTER — CLINICAL SUPPORT (OUTPATIENT)
Dept: REHABILITATION | Facility: HOSPITAL | Age: 39
End: 2024-03-06
Payer: COMMERCIAL

## 2024-03-06 DIAGNOSIS — M62.838 MUSCLE SPASM: ICD-10-CM

## 2024-03-06 DIAGNOSIS — R10.2 PELVIC PAIN: Primary | ICD-10-CM

## 2024-03-06 PROCEDURE — 97530 THERAPEUTIC ACTIVITIES: CPT | Mod: PN | Performed by: PHYSICAL THERAPIST

## 2024-03-06 PROCEDURE — 97140 MANUAL THERAPY 1/> REGIONS: CPT | Mod: PN | Performed by: PHYSICAL THERAPIST

## 2024-03-06 NOTE — PROGRESS NOTES
Pelvic Health Physical Therapy   Treatment Note and Progress Note     Name: Lou Mcadams  Clinic Number: [id number]    Therapy Diagnosis:   Encounter Diagnoses   Name Primary?    Pelvic pain Yes    Muscle spasm      Physician: Giuliana Juarez *    Visit Date: 3/6/2024    Physician Orders: PT Eval and Treat PF PT  Medical Diagnosis from Referral: R10.2 (ICD-10-CM) - Pelvic pain  Evaluation Date: 1/24/2024  Authorization Period Expiration: 12/31/2024  Plan of Care Expiration: 4/17/2024  Progress Note Due: 4/3/2024  Visit # 4/12 Visits authorized: 2/20   FOTO: 2/3      Cancelled Visits: 3  No Show Visits: 0    Time In: 11:22 AM   Time Out:12:00 PM    Total Billable Time: 38  minutes    Precautions: Standard    Subjective     Pt reports: States that her pain is doing better. She did see her functional medicine MD. She changed her progesterone to doing 2 weeks on and 2 weeks off. She has not been working as much which has helped. She does report that she has been very busy. States that her bowels are moving well as well. Has been doing strength training at [employer] and [employer].     She was compliant with home exercise program.  Response to previous treatment: none  Functional change: none     Pain in:  0/10  Pain out: 0/10  Location: pelvic        Objective       Lou participated in dynamic functional therapeutic activities to improve functional performance for 13  minutes, including:  Encouraged down training and stretches, provided home exercise program for lower extremity ROM.     Lou received the following manual therapy techniques: to develop flexibility and extensibility for 25 minutes including: PF manual therapy following exam with trigger point release to the bilateral bulbocavernosus and bilateral levator ani. Significant PF tension noted with inability to relax.      VAGINAL PELVIC FLOOR EXAM    EXTERNAL ASSESSMENT  Introitus: WNL  Skin condition: WNL  Scarring: none   Sensation:  WNL   Pain:  none  Voluntary contraction: visible lift  Voluntary relaxation: nil  Involuntary contraction: DNT  Bearing down: nil  Perineal descent: absent      INTERNAL ASSESSMENT  Pain: trigger points as follows: bilateral bulbocavernosus and bilateral levator ani    Sensation: WNL  Vaginal vault: stenotic   Muscle Bulk: hypertonus   Muscle Power: 3/5  Muscle Endurance: DNT  # Reps To Fatigue: DNT     Fast Contractions: DNT      Quality of contraction: incomplete relaxation   Specificity: WNL   Coordination: WNL - impaired relaxation, impaired PF mobility    Prolapse check: n/a  Comments: impaired PF mobility          Intervention Eval  01/30/2024 03/06/2024           Neuro Re-Ed    rehabilitative ultrasound imaging to help visualize pelvic floor muscle contraction and relaxation with kegels  Rehabilitative ultrasound      Manual Ther Vaginal exam     Hypertonicity - inability to relax   TherAct     Gastro colic, diaphragmatic breathing, improved body awareness           Home Exercises Provided and Patient Education Provided     Education provided:   - anatomy/physiology of pelvic floor, posture/body mechanices, proper bearing down techniques, and fluid intake/dietary modifications  Discussed progression of plan of care with patient; educated pt in activity modification; reviewed HEP with pt. Pt demonstrated and verbalized understanding of all instruction and was provided with a handout of HEP (see Patient Instructions).    Written Home Exercises Provided: Patient instructed to cont prior HEP.  Exercises were reviewed and Lou was able to demonstrate them prior to the end of the session.  Lou demonstrated good  understanding of the education provided.     See EMR under Patient Instructions for exercises provided 03/06/2024 .    Assessment     Good understanding of physical therapist recommendations. Very limited pelvic floor mobility. Pain is improving with her efforts.     Lou Is progressing well towards  her goals.   Pt prognosis is Good.     Pt will continue to benefit from skilled outpatient physical therapy to address the deficits listed in the problem list box on initial evaluation, provide pt/family education and to maximize pt's level of independence in the home and community environment.     Pt's spiritual, cultural and educational needs considered and pt agreeable to plan of care and goals.     Anticipated barriers to physical therapy: none    Short Term Goals: 6 weeks   Patient will be independent with pelvic floor relaxation to improve bowel and bladder evacuation. Goal not met- progressing  Patient will be able to demonstrate improved pelvic floor relaxation and contraction on rehabilitative ultrasound. Goal not met- progressing  Patient will deny urgency of urine. Goal met - 03/06/2024   Patient will report being consistent with improved water intake and decreased caffeine intake. Goal met - 03/06/2024      Long Term Goals: 12 weeks   Patient will report urinating every 3-4 hours with strong urine stream. Goal met - 03/06/2024   Patient will deny pelvic pain. Goal not met- progressing  Patient will be independent with progressive home exercise program. Goal not met- progressing  Patient will be independent with use of pelvic wand to assist with pelvic pain. Goal not met- progressing  Patient will be able to use tampons without pain. Goal met - 03/06/2024  Patient will demonstrate adequate pelvic floor coordination with contraction and relaxation on sEMG vs rehabilitative ultrasound. Goal not met- progressing    Plan     Continue with progressive PF mobility and awareness.     Tod Tsang, PT

## 2024-08-13 ENCOUNTER — DOCUMENTATION ONLY (OUTPATIENT)
Dept: REHABILITATION | Facility: HOSPITAL | Age: 39
End: 2024-08-13
Payer: COMMERCIAL

## 2024-08-13 NOTE — PROGRESS NOTES
Pelvic Health Physical Therapy   Discharge Summary     Name: oLu Mcadams  Clinic Number: [id number]    Therapy Diagnosis:        Encounter Diagnoses   Name Primary?    Pelvic pain Yes    Muscle spasm        Physician: Giuliana Juarez *    Physician Orders: PT Eval and Treat PF PT  Medical Diagnosis from Referral: R10.2 (ICD-10-CM) - Pelvic pain  Evaluation Date: 1/24/2024  Authorization Period Expiration: 12/31/2024  Plan of Care Expiration: 4/17/2024  Progress Note Due: 4/3/2024  Visit # 4/12 Visits authorized: 2/20   FOTO: 2/3    Assessment     Patient did not complete POC    Short Term Goals: 6 weeks   Patient will be independent with pelvic floor relaxation to improve bowel and bladder evacuation. Goal not met- progressing  Patient will be able to demonstrate improved pelvic floor relaxation and contraction on rehabilitative ultrasound. Goal not met- progressing  Patient will deny urgency of urine. Goal met - 03/06/2024   Patient will report being consistent with improved water intake and decreased caffeine intake. Goal met - 03/06/2024      Long Term Goals: 12 weeks   Patient will report urinating every 3-4 hours with strong urine stream. Goal met - 03/06/2024   Patient will deny pelvic pain. Goal not met- progressing  Patient will be independent with progressive home exercise program. Goal not met- progressing  Patient will be independent with use of pelvic wand to assist with pelvic pain. Goal not met- progressing  Patient will be able to use tampons without pain. Goal met - 03/06/2024  Patient will demonstrate adequate pelvic floor coordination with contraction and relaxation on sEMG vs rehabilitative ultrasound. Goal not met- progressing    Plan     Discharge physical therapy at this time.     Tod Tsang, PT